# Patient Record
Sex: FEMALE | Race: WHITE | NOT HISPANIC OR LATINO | Employment: UNEMPLOYED | URBAN - METROPOLITAN AREA
[De-identification: names, ages, dates, MRNs, and addresses within clinical notes are randomized per-mention and may not be internally consistent; named-entity substitution may affect disease eponyms.]

---

## 2019-03-25 LAB
EXT 24 HR UR METANEPHRINE: ABNORMAL
EXT 24 HR UR NORMETANEPHRINE: ABNORMAL
EXT 24 HR UR NORMETANEPHRINE: ABNORMAL
EXT 25 HYDROXY VIT D2: ABNORMAL
EXT 25 HYDROXY VIT D3: ABNORMAL
EXT 5 HIAA 24 HR URINE: ABNORMAL
EXT 5 HIAA BLOOD: ABNORMAL
EXT ACTH: ABNORMAL
EXT AFP: ABNORMAL
EXT ALBUMIN: ABNORMAL
EXT ALKALINE PHOSPHATASE: ABNORMAL
EXT ALT: ABNORMAL
EXT AMYLASE: ABNORMAL
EXT ANTI ISLET CELL AB: ABNORMAL
EXT ANTI PARIETAL CELL AB: ABNORMAL
EXT ANTI THYROID AB: ABNORMAL
EXT AST: ABNORMAL
EXT BILIRUBIN DIRECT: ABNORMAL MG/DL
EXT BILIRUBIN TOTAL: ABNORMAL
EXT BK VIRUS DNA QN PCR: ABNORMAL
EXT BUN: ABNORMAL
EXT C PEPTIDE: ABNORMAL
EXT CA 125: ABNORMAL
EXT CA 19-9: ABNORMAL
EXT CA 27-29: ABNORMAL
EXT CALCITONIN: ABNORMAL
EXT CALCIUM: ABNORMAL
EXT CEA: ABNORMAL
EXT CHLORIDE: ABNORMAL
EXT CHOLESTEROL: ABNORMAL
EXT CHROMOGRANIN A: 2596 (ref 0–95)
EXT CO2: ABNORMAL
EXT CREATININE UA: ABNORMAL
EXT CREATININE: ABNORMAL MG/DL
EXT CYCLOSPORONE LEVEL: ABNORMAL
EXT DOPAMINE: ABNORMAL
EXT EBV DNA BY PCR: ABNORMAL
EXT EPINEPHRINE: ABNORMAL
EXT FOLATE: ABNORMAL
EXT FREE T3: ABNORMAL
EXT FREE T4: ABNORMAL
EXT FSH: ABNORMAL
EXT GASTRIN RELEASING PEPTIDE: ABNORMAL
EXT GASTRIN RELEASING PEPTIDE: ABNORMAL
EXT GASTRIN: ABNORMAL
EXT GGT: ABNORMAL
EXT GHRELIN: ABNORMAL
EXT GLUCAGON: ABNORMAL
EXT GLUCOSE: ABNORMAL
EXT GROWTH HORMONE: ABNORMAL
EXT HCV RNA QUANT PCR: ABNORMAL
EXT HDL: ABNORMAL
EXT HEMATOCRIT: ABNORMAL
EXT HEMOGLOBIN A1C: ABNORMAL %
EXT HEMOGLOBIN: ABNORMAL
EXT HISTAMINE 24 HR URINE: ABNORMAL
EXT HISTAMINE: ABNORMAL
EXT IGF-1: ABNORMAL
EXT IMMUNKNOW (NON-STIMULATED): ABNORMAL
EXT IMMUNKNOW (STIMULATED): ABNORMAL
EXT INR: ABNORMAL
EXT INSULIN: ABNORMAL
EXT LANREOTIDE LEVEL: ABNORMAL
EXT LDH, TOTAL: ABNORMAL
EXT LDL CHOLESTEROL: ABNORMAL
EXT LIPASE: ABNORMAL
EXT MAGNESIUM: ABNORMAL
EXT METANEPHRINE FREE PLASMA: ABNORMAL
EXT MOTILIN: ABNORMAL
EXT NEUROKININ A CAMB: ABNORMAL
EXT NEUROKININ A ISI: ABNORMAL
EXT NEUROTENSIN: ABNORMAL
EXT NOREPINEPHRINE: ABNORMAL
EXT NORMETANEPHRINE: ABNORMAL
EXT NSE: ABNORMAL
EXT OCTREOTIDE LEVEL: ABNORMAL
EXT PANCREASTATIN CAMB: ABNORMAL
EXT PANCREASTATIN ISI: ABNORMAL
EXT PANCREATIC POLYPEPTIDE: ABNORMAL
EXT PHOSPHORUS: ABNORMAL
EXT PLATELETS: ABNORMAL
EXT POTASSIUM: ABNORMAL
EXT PROGRAF LEVEL: ABNORMAL
EXT PROLACTIN: ABNORMAL
EXT PROTEIN TOTAL: ABNORMAL
EXT PROTEIN UA: ABNORMAL
EXT PT: ABNORMAL
EXT PTH, INTACT: ABNORMAL
EXT PTT: ABNORMAL
EXT RAPAMUNE LEVEL: ABNORMAL
EXT SEROTONIN: ABNORMAL
EXT SODIUM: ABNORMAL MMOL/L
EXT SOMATOSTATIN: ABNORMAL
EXT SUBSTANCE P: ABNORMAL
EXT TRIGLYCERIDES: ABNORMAL
EXT TRYPTASE: ABNORMAL
EXT TSH: ABNORMAL
EXT URIC ACID: ABNORMAL
EXT URINE AMYLASE U/HR: ABNORMAL
EXT URINE AMYLASE U/L: ABNORMAL
EXT VASOACTIVE INTESTINAL POLYPEPTIDE: ABNORMAL
EXT VITAMIN B12: ABNORMAL
EXT VMA 24 HR URINE: ABNORMAL
EXT WBC: ABNORMAL
NEURON SPECIFIC ENOLASE: ABNORMAL

## 2019-04-23 LAB
EXT 24 HR UR METANEPHRINE: ABNORMAL
EXT 24 HR UR NORMETANEPHRINE: ABNORMAL
EXT 24 HR UR NORMETANEPHRINE: ABNORMAL
EXT 25 HYDROXY VIT D2: ABNORMAL
EXT 25 HYDROXY VIT D3: ABNORMAL
EXT 5 HIAA 24 HR URINE: ABNORMAL
EXT 5 HIAA BLOOD: ABNORMAL
EXT ACTH: ABNORMAL
EXT AFP: ABNORMAL
EXT ALBUMIN: ABNORMAL
EXT ALKALINE PHOSPHATASE: ABNORMAL
EXT ALT: ABNORMAL
EXT AMYLASE: ABNORMAL
EXT ANTI ISLET CELL AB: ABNORMAL
EXT ANTI PARIETAL CELL AB: ABNORMAL
EXT ANTI THYROID AB: ABNORMAL
EXT AST: ABNORMAL
EXT BILIRUBIN DIRECT: ABNORMAL MG/DL
EXT BILIRUBIN TOTAL: ABNORMAL
EXT BK VIRUS DNA QN PCR: ABNORMAL
EXT BUN: ABNORMAL
EXT C PEPTIDE: ABNORMAL
EXT CA 125: ABNORMAL
EXT CA 19-9: ABNORMAL
EXT CA 27-29: ABNORMAL
EXT CALCITONIN: ABNORMAL
EXT CALCIUM: ABNORMAL
EXT CEA: ABNORMAL
EXT CHLORIDE: ABNORMAL
EXT CHOLESTEROL: ABNORMAL
EXT CHROMOGRANIN A: 2335 (ref 0–95)
EXT CO2: ABNORMAL
EXT CREATININE UA: ABNORMAL
EXT CREATININE: ABNORMAL MG/DL
EXT CYCLOSPORONE LEVEL: ABNORMAL
EXT DOPAMINE: ABNORMAL
EXT EBV DNA BY PCR: ABNORMAL
EXT EPINEPHRINE: ABNORMAL
EXT FOLATE: ABNORMAL
EXT FREE T3: ABNORMAL
EXT FREE T4: ABNORMAL
EXT FSH: ABNORMAL
EXT GASTRIN RELEASING PEPTIDE: ABNORMAL
EXT GASTRIN RELEASING PEPTIDE: ABNORMAL
EXT GASTRIN: ABNORMAL
EXT GGT: ABNORMAL
EXT GHRELIN: ABNORMAL
EXT GLUCAGON: ABNORMAL
EXT GLUCOSE: ABNORMAL
EXT GROWTH HORMONE: ABNORMAL
EXT HCV RNA QUANT PCR: ABNORMAL
EXT HDL: ABNORMAL
EXT HEMATOCRIT: ABNORMAL
EXT HEMOGLOBIN A1C: ABNORMAL %
EXT HEMOGLOBIN: ABNORMAL
EXT HISTAMINE 24 HR URINE: ABNORMAL
EXT HISTAMINE: ABNORMAL
EXT IGF-1: ABNORMAL
EXT IMMUNKNOW (NON-STIMULATED): ABNORMAL
EXT IMMUNKNOW (STIMULATED): ABNORMAL
EXT INR: ABNORMAL
EXT INSULIN: ABNORMAL
EXT LANREOTIDE LEVEL: ABNORMAL
EXT LDH, TOTAL: ABNORMAL
EXT LDL CHOLESTEROL: ABNORMAL
EXT LIPASE: ABNORMAL
EXT MAGNESIUM: ABNORMAL
EXT METANEPHRINE FREE PLASMA: ABNORMAL
EXT MOTILIN: ABNORMAL
EXT NEUROKININ A CAMB: ABNORMAL
EXT NEUROKININ A ISI: ABNORMAL
EXT NEUROTENSIN: ABNORMAL
EXT NOREPINEPHRINE: ABNORMAL
EXT NORMETANEPHRINE: ABNORMAL
EXT NSE: ABNORMAL
EXT OCTREOTIDE LEVEL: ABNORMAL
EXT PANCREASTATIN CAMB: ABNORMAL
EXT PANCREASTATIN ISI: ABNORMAL
EXT PANCREATIC POLYPEPTIDE: ABNORMAL
EXT PHOSPHORUS: ABNORMAL
EXT PLATELETS: ABNORMAL
EXT POTASSIUM: ABNORMAL
EXT PROGRAF LEVEL: ABNORMAL
EXT PROLACTIN: ABNORMAL
EXT PROTEIN TOTAL: ABNORMAL
EXT PROTEIN UA: ABNORMAL
EXT PT: ABNORMAL
EXT PTH, INTACT: ABNORMAL
EXT PTT: ABNORMAL
EXT RAPAMUNE LEVEL: ABNORMAL
EXT SEROTONIN: ABNORMAL
EXT SODIUM: ABNORMAL MMOL/L
EXT SOMATOSTATIN: ABNORMAL
EXT SUBSTANCE P: ABNORMAL
EXT TRIGLYCERIDES: ABNORMAL
EXT TRYPTASE: ABNORMAL
EXT TSH: ABNORMAL
EXT URIC ACID: ABNORMAL
EXT URINE AMYLASE U/HR: ABNORMAL
EXT URINE AMYLASE U/L: ABNORMAL
EXT VASOACTIVE INTESTINAL POLYPEPTIDE: ABNORMAL
EXT VITAMIN B12: ABNORMAL
EXT VMA 24 HR URINE: ABNORMAL
EXT WBC: ABNORMAL
NEURON SPECIFIC ENOLASE: ABNORMAL

## 2019-05-16 LAB
EXT 24 HR UR METANEPHRINE: ABNORMAL
EXT 24 HR UR NORMETANEPHRINE: ABNORMAL
EXT 24 HR UR NORMETANEPHRINE: ABNORMAL
EXT 25 HYDROXY VIT D2: ABNORMAL
EXT 25 HYDROXY VIT D3: ABNORMAL
EXT 5 HIAA 24 HR URINE: ABNORMAL
EXT 5 HIAA BLOOD: ABNORMAL
EXT ACTH: ABNORMAL
EXT AFP: ABNORMAL
EXT ALBUMIN: 4.1 G/DL (ref 3.5–5)
EXT ALKALINE PHOSPHATASE: 75 U/L (ref 38–126)
EXT ALT: 14 U/L (ref 11–66)
EXT AMYLASE: ABNORMAL
EXT ANTI ISLET CELL AB: ABNORMAL
EXT ANTI PARIETAL CELL AB: ABNORMAL
EXT ANTI THYROID AB: ABNORMAL
EXT AST: 16 U/L (ref 15–46)
EXT BILIRUBIN DIRECT: ABNORMAL MG/DL
EXT BILIRUBIN TOTAL: 1.3 MG/DL (ref 0.2–1.3)
EXT BK VIRUS DNA QN PCR: ABNORMAL
EXT BUN: 10 MG/DL (ref 9–21)
EXT C PEPTIDE: ABNORMAL
EXT CA 125: ABNORMAL
EXT CA 19-9: ABNORMAL
EXT CA 27-29: ABNORMAL
EXT CALCITONIN: ABNORMAL
EXT CALCIUM: 9.4 MG/DL (ref 8.4–10.6)
EXT CEA: ABNORMAL
EXT CHLORIDE: 107 MMOL/L (ref 98–107)
EXT CHOLESTEROL: ABNORMAL
EXT CHROMOGRANIN A: 2998 NG/ML (ref 0–95)
EXT CO2: 22 MMOL/L (ref 22–30)
EXT CREATININE UA: ABNORMAL
EXT CREATININE: 0.82 MG/DL (ref 0.57–1.11)
EXT CYCLOSPORONE LEVEL: ABNORMAL
EXT DOPAMINE: ABNORMAL
EXT EBV DNA BY PCR: ABNORMAL
EXT EPINEPHRINE: ABNORMAL
EXT FOLATE: ABNORMAL
EXT FREE T3: ABNORMAL
EXT FREE T4: ABNORMAL
EXT FSH: ABNORMAL
EXT GASTRIN RELEASING PEPTIDE: ABNORMAL
EXT GASTRIN RELEASING PEPTIDE: ABNORMAL
EXT GASTRIN: ABNORMAL
EXT GGT: ABNORMAL
EXT GHRELIN: ABNORMAL
EXT GLUCAGON: ABNORMAL
EXT GLUCOSE: 144 MG/DL (ref 70–110)
EXT GROWTH HORMONE: ABNORMAL
EXT HCV RNA QUANT PCR: ABNORMAL
EXT HDL: ABNORMAL
EXT HEMATOCRIT: 37.7 % (ref 37.7–47.9)
EXT HEMOGLOBIN A1C: ABNORMAL %
EXT HEMOGLOBIN: 12.8 G/DL (ref 12.2–15.2)
EXT HISTAMINE 24 HR URINE: ABNORMAL
EXT HISTAMINE: ABNORMAL
EXT IGF-1: ABNORMAL
EXT IMMUNKNOW (NON-STIMULATED): ABNORMAL
EXT IMMUNKNOW (STIMULATED): ABNORMAL
EXT INR: ABNORMAL
EXT INSULIN: ABNORMAL
EXT LANREOTIDE LEVEL: ABNORMAL
EXT LDH, TOTAL: ABNORMAL
EXT LDL CHOLESTEROL: ABNORMAL
EXT LIPASE: ABNORMAL
EXT MAGNESIUM: ABNORMAL
EXT METANEPHRINE FREE PLASMA: ABNORMAL
EXT MOTILIN: ABNORMAL
EXT NEUROKININ A CAMB: ABNORMAL
EXT NEUROKININ A ISI: ABNORMAL
EXT NEUROTENSIN: ABNORMAL
EXT NOREPINEPHRINE: ABNORMAL
EXT NORMETANEPHRINE: ABNORMAL
EXT NSE: ABNORMAL
EXT OCTREOTIDE LEVEL: ABNORMAL
EXT PANCREASTATIN CAMB: ABNORMAL
EXT PANCREASTATIN ISI: ABNORMAL
EXT PANCREATIC POLYPEPTIDE: ABNORMAL
EXT PHOSPHORUS: ABNORMAL
EXT PLATELETS: 209 10X3/UL (ref 142–424)
EXT POTASSIUM: 3.7 MMOL/L (ref 3.6–5)
EXT PROGRAF LEVEL: ABNORMAL
EXT PROLACTIN: ABNORMAL
EXT PROTEIN TOTAL: 6.7 G/DL (ref 6.3–8.2)
EXT PROTEIN UA: ABNORMAL
EXT PT: ABNORMAL
EXT PTH, INTACT: ABNORMAL
EXT PTT: ABNORMAL
EXT RAPAMUNE LEVEL: ABNORMAL
EXT SEROTONIN: ABNORMAL
EXT SODIUM: 140 MMOL/L (ref 136–146)
EXT SOMATOSTATIN: ABNORMAL
EXT SUBSTANCE P: ABNORMAL
EXT TRIGLYCERIDES: ABNORMAL
EXT TRYPTASE: ABNORMAL
EXT TSH: ABNORMAL
EXT URIC ACID: ABNORMAL
EXT URINE AMYLASE U/HR: ABNORMAL
EXT URINE AMYLASE U/L: ABNORMAL
EXT VASOACTIVE INTESTINAL POLYPEPTIDE: ABNORMAL
EXT VITAMIN B12: ABNORMAL
EXT VMA 24 HR URINE: ABNORMAL
EXT WBC: 5.7 10X3/UL (ref 4.6–10.9)
NEURON SPECIFIC ENOLASE: ABNORMAL

## 2019-05-28 NOTE — PROGRESS NOTES
"Subjective:      Patient ID: Christina Muro is a 59 y.o. female.    Chief Complaint:  Diabetes (New patient )    History of Present Illness  Christina Muro is here for evaluation and management of carcinoid tumor.  This is their first visit with me.      Unexplained diarrhea: chronic.  Over the last 3 months this has worsened.  Immediately after she eats she has to has diarrhea.    Unexplained flushing: has been over 3.5 years since she's had "carcinoid flushing" that would last 15-20 minutes.     ONCOLOGIC HISTORY:  1. Patient was first diagnosed with well-differentiated cecal carcinoid in 1/2011 when she underwent ileocecal segmental resection (unknown T and N stage). She was found to have metastatic disease in her liver in 12/2001 and underwent partal hepatectomy with complete removal of her metastatic disease.  2. She remained disease free when she recurred in the liver in 12/2007. She then again underwent partial hepatectomy again in 1/16/2008 with resection of all evident disease. Pathology from this specimen reconfirmed the presence of her original well differentiated NET.  3. She remained disease free until a liver MRI in 1/2009 revealed concern for bi-lobar hepatic recurrence. She underwent an octreoscan shortly thereafter, established with  ([facility]) and started octreotide LAR monthly.  4. Patient remained on octreotide LAR until 3/2016 when in the setting of progression she was switched to lanreotide 120 mg monthly. She remains on this dose however was have noted to have progression in her liver on CT 5/10/2018 and abdominal MRI 5/24/2018. The most recent scan revealed at least 20 lesions per the outside read.   5. Her PS is 0/1. She has been having 4-8 episodes of fatty foul smelling stool with bowel urgency.    History of Present Illness:  is a 58 year old woman with metastatic well differentiated cecal carcinoid who presents for a repeat evaluation in the setting of " recent progression (radiographically and clinically) on her lanreotide. She denies SOB, LE edema, nausea, vomiting, abdominal pain, light-headedness or weight loss. She has been feeling quite well with the exception of her bowel habits (noted above).   Assessment and Plan:   #Metastatic Well Differentiated NET: patient appears to have progressed radiographically on lanreotide with worsening disease burden in her liver. She has been experiencing worsening diarrhea over the last several months which likely represents clinical progression. We had an extensive discussion with the patient about the trajectory of her disease, treatment options moving forward and the logistics of PRRT. We will obtain a gallium dotatate PET to fully assess the burden of her disease and put her on our scheduling list for PRRT. We have also ordered a baseline echocardiogram to assess her valvular function given her long standing carcinoid and plasma 5HIAA and chromogranin A to assess the functional status and disease burden, respectively. Patient can continue on lanreotide monthly while she awaits PRRT. Once we have a date we will typically stagger lanreotide such that patient is off the agent for 4 weeks prior to administration (she would receive SSA the same day after PRRT infusion is complete). RTC to be determined based on PRRT availability. Per records they may start:  LUTATHERA® (lutetium Brii 177 dotatate) is a prescription medicine used to treat adults with a type of cancer known as gastroenteropancreatic neuroendocrine tumors (GEP-NETs) that are positive for the hormone receptor somatostatin, including GEP-NETs in the foregut, midgut, and hindgut.    Per the patient, she has been on Lanreotide injection every 4 weeks.  This was initiated by Dr. Kehinde Rhodes on 3/9/09.  Was self administering multi daily injections to herself for several years then switched over to every 4 week injections.  Currently managed by Dr. Guerrier in [place],  [place] who is a carcinoid specialist.     Review of Systems   Constitutional: Negative for fatigue and unexpected weight change.   Eyes: Negative for visual disturbance.   Respiratory: Negative for cough and shortness of breath.    Cardiovascular: Negative for chest pain.   Gastrointestinal: Positive for diarrhea. Negative for abdominal pain.   Endocrine: Negative for cold intolerance, heat intolerance, polydipsia, polyphagia and polyuria.   Musculoskeletal: Negative for arthralgias.   Skin: Negative for wound.   Neurological: Negative for headaches.   Hematological: Does not bruise/bleed easily.   Psychiatric/Behavioral: Negative for sleep disturbance.     Objective:   Physical Exam   Constitutional: She appears well-developed.   HENT:   Right Ear: External ear normal.   Left Ear: External ear normal.   Nose: Nose normal.   Hearing Normal     Neck: No tracheal deviation present. No thyromegaly present.   Cardiovascular: Normal rate.   No murmur heard.  No edema present   Pulmonary/Chest: Effort normal and breath sounds normal.   Abdominal: Soft. She exhibits no mass. No hernia.   Neurological: She is alert. No cranial nerve deficit or sensory deficit.   Skin: No rash noted.   No nodules.   Psychiatric: She has a normal mood and affect. Judgment normal.   Vitals reviewed.    Body mass index is 22.76 kg/m².    Lab Review:   Chromogranin A 1,668 (H)    Pet Tumor Ga68 Dotatate: 7/12/2018  BRAIN:   Normal high uptake of Gallium-68-Dotatate is demonstrated in the   pituitary gland. No abnormal areas of increased uptake are   demonstrated in the brain.     HEAD AND NECK:  Normal uptake of Gallium-68-Dotatate is seen in the thyroid gland. No   abnormal areas of increased uptake are demonstrated in the head and   neck.     CHEST:  The thoracic inlet is normal. The lungs are clear. The heart is   mildly enlarged. There is no pericardial effusion.    ABDOMEN/PELVIS:  Multiple regions of increased uptake are demonstrated in the  liver,   the  largest of which corresponds to a 3.9 x 3.6 cm hypodensity   anterolaterally, increased in size from prior abdominal MRI from   5/24/2018 when it measured 3.6 x 3.3 cm. Additional notable areas of   increased uptake are demonstrated in the miky hepatis corresponding   to a 1 cm lymph node, a right retroperitoneal lesion, and three sub   cm aortocaval nodes at the level of the renal vessels,  L2-L3 disc   space, and the superior aspect of L4.      Postoperative findings of prior ileocecectomy are demonstrated with   intact ileocolic anastomosis in the right lower quadrant. There is   soft tissue stranding and nodularity in the pelvic mesentery   moderately increased in extent, measuring approximately 5 x 2 cm) as   compared to 6/14/2017 and associated with intense uptake of   Gallium-68-Dotatate.     Physiologic Gallium-68-Dotatate uptake is demonstrated in the spleen,   adrenal glands, pancreas, kidneys, collecting systems, and bladder.   Scattered areas of mild-to-moderate uptake of radiotracer are  No areas of abnormal Dotatate uptake in the axial or visualized   appendicular skeleton is appreciated other than mild uptake   associated with prominent anterior osteophytes in the lower cervical   spine.   IMPRESSION:  1.  Multiple large hepatic lesions showing high Gallium-68-Dotatate   compatible with well-differentiated neuroendocrine metastases, more   extensive than suggested by prior outside MR of the abdomen from   5/24/2018.  2.  Additional areas of high Gallium-68-Dotatate are seen at the   miky hepatis, right aspect of the retroperitoneum, and high and low   aortocaval nodes, compatible with tumor.    3.  High Gallium-68-Dotatate uptake in the pelvic mesentery to the   site of ileocolic anastomosis is highly suggestive of local tumor   recurrence as well.     Pathology report 2/2009  DIAGNOSIS:    1) CECAL MASS, BIOPSY ([id number], A1, 2 SLIDES, 01/09/01):  WELL-DIFFERENTIATED  NEUROENDOCRINE CARCINOMA IN THE BACKGROUND OF ULCERATED  COLONIC MUCOSA.    2) RIGHT COLON AND PORTION OF ILEUM AND GALLBLADDER, RESECTION  ([id number], A1-14, B, 01/12/01): WELL-DIFFERENTIATED NEUROENDOCRINE  CARCINOMA WITH NECROSIS, 3.0 CM IN GREATEST EXTENT(PER REPORT), WITH  EXTENSION THROUGH MUSCULARIS PROPRIA INTO PERICOLONIC ADIPOSE TISSUE;  LYMPHOVASCULAR INVASION PRESENT; 3 OF 13 PERICOLONIC LYMPH NODES INVOLVED  BY METASTATIC NEUROENDOCRINE CARCINOMA; APPENDIX WITH NO SIGNIFICANT  HISTOPATHOLOGIC CHANGE; CHRONIC CHOLECYSTITIS. (SEE COMMENT)    COMMENT: This well-differentiated neuroendocrine carcinoma has a moderate  amount of atypia with scattered rare mitoses. However, this lesion most  likely will behave in an aggressive manner based its large size, abundant  lamina propria invasion, tumoral necrosis and lymphovascular invasion.    3) LIVER, SEGMENT 2, WEDGE RESECTION ([id number], A1-A2, 3 SLIDES, 1/18/08):  METASTATIC WELL DIFFERENTIATED NEUROENDOCRINE CARCINOMA, MARGINS FREE OF  TUMOR; CENTRILOBULAR MICROSTEATOSIS AND MILD PERIPORTAL FIBROSIS (SEE  COMMENT).    COMMENT: Outside Telly trichrome stain is reviewed and supports the above  diagnosis.    4) LIVER, RIGHT LOBE, SEGMENT 8, WEDGE RESECTION ([id number], A1-B10, 17  SLIDES, 1/18/08): METASTATIC WELL DIFFERENTIATED NEUROENDOCRINE CARCINOMA,  FOCALLY EXTENDING TO CAUTERIZED EDGE, MARGINS FREE OF TUMOR (PER REPORT);  LYMPHOVASCULAR INVASION PRESENT.    5) LIVER, SEGMENT 8 SECOND PORTION, WEDGE RESECTION ([id number], C1-C2, 2  SLIDES, 1/18/08): METASTATIC WELL DIFFERENTIATED NEUROENDOCRINE CARCINOMA,  MARGINS FREE OF TUMOR.    6) LIVER, SEGMENT 8 THIRD PORTION, WEDGE RESECTION ([id number], D1-D2, 2  SLIDES, 1/18/08): METASTATIC WELL DIFFERENTIATED NEUROENDOCRINE CARCINOMA,  MARGINS FREE OF TUMOR.    7) LIVER, SEGMENT 2, WEDGE RESECTION (S08-2090, E1-E2, 2 SLIDES, 1/18/08):  METASTATIC WELL DIFFERENTIATED NEUROENDOCRINE CARCINOMA, MARGINS FREE  OF  TUMOR.  No results found for: HGBA1C  No results found for: CHOL, HDL, LDLCALC, TRIG, CHOLHDL  No results found for: NA, K, CL, CO2, GLU, BUN, CREATININE, CALCIUM, PROT, ALBUMIN, BILITOT, ALKPHOS, AST, ALT, ANIONGAP, ESTGFRAFRICA, EGFRNONAA, TSH    Assessment and Plan     1. Neuroendocrine tumor  Ambulatory Referral to Neuroendocrine Tumor Program     Neuroendocrine tumor  -- Due to patient wanting to see a neuroendocrine specialist, referral placed to see Dr. Gant  -- Patient is already following with Dr. Guerrier, a carcinoid specialist in [place] and being treated with Lanreotide injections every 4 weeks  -- With recent decline related to symptoms, patient would like a second opinion on Dr. Guerrier recommendations of trying PRRT and/or radioactive iodine infusions  -- Recommended that the patient obtain a BMD through her PCP or Dr. Guerrier when she returns home  -- Patient prefers to wait for blood work with neuroendocrine specialists, but we do recommend checking CMP, Vit D and HgA1c    Case discussed with Kunal  Recommendations were discussed with the patient in detail  The patient verbalized understanding and agrees with the plan outlined as above.

## 2019-06-04 ENCOUNTER — OFFICE VISIT (OUTPATIENT)
Dept: ENDOCRINOLOGY | Facility: CLINIC | Age: 59
End: 2019-06-04
Payer: COMMERCIAL

## 2019-06-04 VITALS
DIASTOLIC BLOOD PRESSURE: 60 MMHG | WEIGHT: 136.81 LBS | SYSTOLIC BLOOD PRESSURE: 90 MMHG | HEIGHT: 65 IN | BODY MASS INDEX: 22.8 KG/M2 | HEART RATE: 69 BPM

## 2019-06-04 DIAGNOSIS — D3A.8 NEUROENDOCRINE TUMOR: Primary | ICD-10-CM

## 2019-06-04 PROCEDURE — 99999 PR PBB SHADOW E&M-NEW PATIENT-LVL III: ICD-10-PCS | Mod: PBBFAC,,, | Performed by: NURSE PRACTITIONER

## 2019-06-04 PROCEDURE — 3008F BODY MASS INDEX DOCD: CPT | Mod: CPTII,S$GLB,, | Performed by: NURSE PRACTITIONER

## 2019-06-04 PROCEDURE — 99203 PR OFFICE/OUTPT VISIT, NEW, LEVL III, 30-44 MIN: ICD-10-PCS | Mod: S$GLB,,, | Performed by: NURSE PRACTITIONER

## 2019-06-04 PROCEDURE — 3008F PR BODY MASS INDEX (BMI) DOCUMENTED: ICD-10-PCS | Mod: CPTII,S$GLB,, | Performed by: NURSE PRACTITIONER

## 2019-06-04 PROCEDURE — 99203 OFFICE O/P NEW LOW 30 MIN: CPT | Mod: S$GLB,,, | Performed by: NURSE PRACTITIONER

## 2019-06-04 PROCEDURE — 99999 PR PBB SHADOW E&M-NEW PATIENT-LVL III: CPT | Mod: PBBFAC,,, | Performed by: NURSE PRACTITIONER

## 2019-06-04 RX ORDER — ESCITALOPRAM OXALATE 10 MG/1
TABLET ORAL
COMMUNITY

## 2019-06-04 RX ORDER — ACETAMINOPHEN AND PHENYLEPHRINE HCL 325; 5 MG/1; MG/1
7 TABLET ORAL
COMMUNITY

## 2019-06-04 NOTE — ASSESSMENT & PLAN NOTE
-- Due to patient wanting to see a neuroendocrine specialist, referral placed to see Dr. Gant  -- Patient is already following with Dr. Guerrier, a carcinoid specialist in [place] and being treated with Lanreotide injections every 4 weeks  -- With recent decline related to symptoms, patient would like a second opinion on Dr. Guerrier recommendations of trying PRRT and/or radioactive iodine infusions  -- Recommended that the patient obtain a BMD through her PCP or Dr. Guerrier when she returns home  -- Patient prefers to wait for blood work with neuroendocrine specialists, but we do recommend checking CMP, Vit D and HgA1c

## 2019-06-06 ENCOUNTER — TELEPHONE (OUTPATIENT)
Dept: NEUROLOGY | Facility: HOSPITAL | Age: 59
End: 2019-06-06

## 2019-06-06 ENCOUNTER — TELEPHONE (OUTPATIENT)
Dept: ENDOCRINOLOGY | Facility: CLINIC | Age: 59
End: 2019-06-06

## 2019-06-06 DIAGNOSIS — C7A.021 MALIGNANT CARCINOID TUMOR OF THE CECUM: Primary | ICD-10-CM

## 2019-06-06 NOTE — TELEPHONE ENCOUNTER
"Spoke to patient, discussed her history.  She was diagnosed with NET of cecum in 2001, then mets to liver same year.  She then recurred with hepatectomy years later.  She was seen at our clinic in 2009 or 2010 by Dr. Kehinde Rhodes, will have her chart pulled if it is still there.  She is currently on Lanreotide only. She is currently seeing DR. Devante Guerrier with [facility] and was referred back to our clinic.  She states that she was "scanned" recently but doesn't remember what scan. She thinks she had a Ga68 scan because she had to go somewhere different for that scan. I asked her to find the scans and have them sent to our institution.  I will request records from [facility] since I have an AMDELINE.  Address given to patient to mail scan.  I will contact patient when information received and let her know if any additional testing is needed prior to making an appt.     "

## 2019-06-06 NOTE — TELEPHONE ENCOUNTER
----- Message from Kristin Ashby sent at 6/6/2019  9:41 AM CDT -----  Contact: Yara / [facility] [phone]   Yara is requesting office note from visit from 6/4.     [phone] (Fax)

## 2019-06-10 ENCOUNTER — TELEPHONE (OUTPATIENT)
Dept: NEUROLOGY | Facility: HOSPITAL | Age: 59
End: 2019-06-10

## 2019-06-10 NOTE — TELEPHONE ENCOUNTER
----- Message from Carlton Philip sent at 6/10/2019 10:28 AM CDT -----  Contact: Dr. Guerrier ([facility])   Calling to verify exactly which office notes (labs and scans) that are needing to be faxed over.        Contact:: [phone]

## 2019-06-13 ENCOUNTER — TELEPHONE (OUTPATIENT)
Dept: NEUROLOGY | Facility: HOSPITAL | Age: 59
End: 2019-06-13

## 2019-06-13 LAB
EXT 24 HR UR METANEPHRINE: ABNORMAL
EXT 24 HR UR NORMETANEPHRINE: ABNORMAL
EXT 24 HR UR NORMETANEPHRINE: ABNORMAL
EXT 25 HYDROXY VIT D2: ABNORMAL
EXT 25 HYDROXY VIT D3: ABNORMAL
EXT 5 HIAA 24 HR URINE: ABNORMAL
EXT 5 HIAA BLOOD: ABNORMAL
EXT ACTH: ABNORMAL
EXT AFP: ABNORMAL
EXT ALBUMIN: 4.1 (ref 3.5–5)
EXT ALKALINE PHOSPHATASE: 85 (ref 38–126)
EXT ALT: 17 (ref 11–86)
EXT AMYLASE: ABNORMAL
EXT ANTI ISLET CELL AB: ABNORMAL
EXT ANTI PARIETAL CELL AB: ABNORMAL
EXT ANTI THYROID AB: ABNORMAL
EXT AST: 16 (ref 15–46)
EXT BILIRUBIN DIRECT: ABNORMAL MG/DL
EXT BILIRUBIN TOTAL: 1.2 (ref 0.2–1.3)
EXT BK VIRUS DNA QN PCR: ABNORMAL
EXT BUN: 9 (ref 9–21)
EXT C PEPTIDE: ABNORMAL
EXT CA 125: ABNORMAL
EXT CA 19-9: ABNORMAL
EXT CA 27-29: ABNORMAL
EXT CALCITONIN: ABNORMAL
EXT CALCIUM: 9.6 (ref 8.4–10.6)
EXT CEA: ABNORMAL
EXT CHLORIDE: 107 (ref 98–107)
EXT CHOLESTEROL: ABNORMAL
EXT CHROMOGRANIN A: 3055 (ref 0–95)
EXT CO2: 28 (ref 22–30)
EXT CREATININE UA: ABNORMAL
EXT CREATININE: 0.77 MG/DL (ref 0.57–1.11)
EXT CYCLOSPORONE LEVEL: ABNORMAL
EXT DOPAMINE: ABNORMAL
EXT EBV DNA BY PCR: ABNORMAL
EXT EPINEPHRINE: ABNORMAL
EXT FOLATE: ABNORMAL
EXT FREE T3: ABNORMAL
EXT FREE T4: ABNORMAL
EXT FSH: ABNORMAL
EXT GASTRIN RELEASING PEPTIDE: ABNORMAL
EXT GASTRIN RELEASING PEPTIDE: ABNORMAL
EXT GASTRIN: ABNORMAL
EXT GGT: ABNORMAL
EXT GHRELIN: ABNORMAL
EXT GLUCAGON: ABNORMAL
EXT GLUCOSE: 93 (ref 70–110)
EXT GROWTH HORMONE: ABNORMAL
EXT HCV RNA QUANT PCR: ABNORMAL
EXT HDL: ABNORMAL
EXT HEMATOCRIT: 38.1 (ref 37.7–47.9)
EXT HEMOGLOBIN A1C: 5.4 % (ref 0–5.7)
EXT HEMOGLOBIN: 12.7 (ref 12.2–15.2)
EXT HISTAMINE 24 HR URINE: ABNORMAL
EXT HISTAMINE: ABNORMAL
EXT IGF-1: ABNORMAL
EXT IMMUNKNOW (NON-STIMULATED): ABNORMAL
EXT IMMUNKNOW (STIMULATED): ABNORMAL
EXT INR: ABNORMAL
EXT INSULIN: ABNORMAL
EXT LANREOTIDE LEVEL: ABNORMAL
EXT LDH, TOTAL: ABNORMAL
EXT LDL CHOLESTEROL: ABNORMAL
EXT LIPASE: ABNORMAL
EXT MAGNESIUM: ABNORMAL
EXT METANEPHRINE FREE PLASMA: ABNORMAL
EXT MOTILIN: ABNORMAL
EXT NEUROKININ A CAMB: ABNORMAL
EXT NEUROKININ A ISI: ABNORMAL
EXT NEUROTENSIN: ABNORMAL
EXT NOREPINEPHRINE: ABNORMAL
EXT NORMETANEPHRINE: ABNORMAL
EXT NSE: ABNORMAL
EXT OCTREOTIDE LEVEL: ABNORMAL
EXT PANCREASTATIN CAMB: ABNORMAL
EXT PANCREASTATIN ISI: ABNORMAL
EXT PANCREATIC POLYPEPTIDE: ABNORMAL
EXT PHOSPHORUS: ABNORMAL
EXT PLATELETS: 210 (ref 142–424)
EXT POTASSIUM: 4.5 (ref 3.6–5)
EXT PROGRAF LEVEL: ABNORMAL
EXT PROLACTIN: ABNORMAL
EXT PROTEIN TOTAL: 6.7 (ref 6.3–8.2)
EXT PROTEIN UA: ABNORMAL
EXT PT: ABNORMAL
EXT PTH, INTACT: ABNORMAL
EXT PTT: ABNORMAL
EXT RAPAMUNE LEVEL: ABNORMAL
EXT SEROTONIN: ABNORMAL
EXT SODIUM: 141 MMOL/L (ref 136–146)
EXT SOMATOSTATIN: ABNORMAL
EXT SUBSTANCE P: ABNORMAL
EXT TRIGLYCERIDES: ABNORMAL
EXT TRYPTASE: ABNORMAL
EXT TSH: ABNORMAL
EXT URIC ACID: ABNORMAL
EXT URINE AMYLASE U/HR: ABNORMAL
EXT URINE AMYLASE U/L: ABNORMAL
EXT VASOACTIVE INTESTINAL POLYPEPTIDE: ABNORMAL
EXT VITAMIN B12: ABNORMAL
EXT VMA 24 HR URINE: ABNORMAL
EXT WBC: 5.1 (ref 4.6–10.9)
NEURON SPECIFIC ENOLASE: ABNORMAL

## 2019-06-13 NOTE — TELEPHONE ENCOUNTER
LVM with office asking if patient had a Ga68 PET/CT recently.  Will contact patient to locate scans as all other records have been received.

## 2019-06-17 ENCOUNTER — TELEPHONE (OUTPATIENT)
Dept: NEUROLOGY | Facility: HOSPITAL | Age: 59
End: 2019-06-17

## 2019-06-17 NOTE — TELEPHONE ENCOUNTER
Spoke to patient, she will find report of Ga68, have it faxed, then I will request the CD from the local agency for our records. I have schedule her initial appt, will ask for reservation at the [facility].

## 2019-06-17 NOTE — TELEPHONE ENCOUNTER
----- Message from Miri Ellison RN sent at 6/6/2019  4:02 PM CDT -----  Check on records and scans

## 2019-06-18 NOTE — TELEPHONE ENCOUNTER
----- Message from Miri Ellison RN sent at 6/17/2019  5:46 PM CDT -----  Regarding: Rosey Mares  She would like a reservation July 22nd to July 23rd. She is a new patient and has the paperwork.

## 2019-07-02 ENCOUNTER — TELEPHONE (OUTPATIENT)
Dept: NEUROLOGY | Facility: HOSPITAL | Age: 59
End: 2019-07-02

## 2019-07-02 NOTE — TELEPHONE ENCOUNTER
----- Message from Avis Deleon sent at 7/2/2019  8:30 AM CDT -----  Contact: Self/ [phone]  Patient would like to add a day of July 21, 2019  to her [place] Reservation Request.  Please call.

## 2019-07-15 ENCOUNTER — TELEPHONE (OUTPATIENT)
Dept: NEUROLOGY | Facility: HOSPITAL | Age: 59
End: 2019-07-15

## 2019-07-15 NOTE — TELEPHONE ENCOUNTER
Forwarded message to JENNY Valverde.      HISTORY       Past Surgical History:   Procedure Laterality Date    CATARACT REMOVAL WITH IMPLANT Left 5/14/2014    CHOLECYSTECTOMY      COLON SURGERY  1991    COLONOSCOPY  07/02/2015    diverticulosis    CORONARY ANGIOPLASTY      CYSTOSCOPY Right 03/23/2017    Cysto, retro, ureteroscopy. Stone manipulation with out extraction right stent placement     GALLBLADDER SURGERY      HEMORRHOID SURGERY      HYSTERECTOMY, TOTAL ABDOMINAL  1980    OTHER SURGICAL HISTORY  05/28/2014    phacoemulsification of cataract with intraocular lens implant right eye    VARICOSE VEIN SURGERY           CURRENT MEDICATIONS       Previous Medications    AMLODIPINE (NORVASC) 2.5 MG TABLET    Take 1 tablet by mouth daily    ASPIRIN EC 81 MG EC TABLET    Take 1 tablet by mouth daily With food    ATORVASTATIN (LIPITOR) 20 MG TABLET    Take 1 tablet by mouth nightly    CALCIUM CARBONATE 600 MG TABS TABLET    Take 1 tablet by mouth daily    CYCLOBENZAPRINE (FLEXERIL) 10 MG TABLET    Take 10 mg by mouth nightly    DICLOFENAC SODIUM (VOLTAREN) 1 % GEL    Apply 2 g topically 4 times daily    FERROUS SULFATE 325 (65 FE) MG TABLET    Take 325 mg by mouth daily (with breakfast)    FISH OIL-OMEGA-3 FATTY ACIDS 1000 MG CAPSULE    Take 1 g by mouth daily     HYDROCODONE-ACETAMINOPHEN (NORCO) 5-325 MG PER TABLET    Take 1 tablet by mouth every 6 hours as needed for Pain. Sara January LANSOPRAZOLE (PREVACID) 30 MG DELAYED RELEASE CAPSULE    TAKE 1 CAPSULE DAILY.     MELATONIN 10 MG TABS    Take 10 mg by mouth nightly as needed    MELOXICAM (MOBIC) 15 MG TABLET    Take 15 mg by mouth daily    METHYLCELLULOSE (CITRUCEL) 500 MG TABS    Take 1 tablet by mouth daily    METOPROLOL SUCCINATE (TOPROL XL) 50 MG EXTENDED RELEASE TABLET    Take 1 tablet by mouth daily    MIRABEGRON ER (MYRBETRIQ) 25 MG TB24    TAKE 1 TABLET DAILY    MULTIPLE VITAMINS-MINERALS (THERAPEUTIC MULTIVITAMIN-MINERALS) TABLET    Take 1 tablet by mouth daily    NITROGLYCERIN (NITROSTAT) 0.4 MG SL TABLET    Place 1 tablet under the tongue every 5 minutes as needed for Chest pain    OLANZAPINE-FLUOXETINE (SYMBYAX) 6-25 MG CAPS CAPSULE    TAKE 1 CAPSULE EVERY       EVENING    POLYETHYLENE GLYCOL (GLYCOLAX) POWDER    POUR 17GM OF POWDER INTO   MEASURING CAP STIR INTO 8OZOF WATER AND DRINK DAILY       ALLERGIES     Sulfamethoxazole; Trimethoprim; Wellbutrin [bupropion hcl]; Advil [ibuprofen micronized]; Bactrim; Erythromycin; Flexeril [cyclobenzaprine hcl]; and Guaifenesin er    FAMILY HISTORY       Family History   Problem Relation Age of Onset    Cancer Mother     Stroke Father           SOCIAL HISTORY       Social History     Socioeconomic History    Marital status:       Spouse name: None    Number of children: 3    Years of education: 15    Highest education level: None   Occupational History    Occupation: retired   Social Needs    Financial resource strain: None    Food insecurity:     Worry: None     Inability: None    Transportation needs:     Medical: None     Non-medical: None   Tobacco Use    Smoking status: Never Smoker    Smokeless tobacco: Never Used   Substance and Sexual Activity    Alcohol use: No    Drug use: No    Sexual activity: Not Currently     Partners: Male   Lifestyle    Physical activity:     Days per week: None     Minutes per session: None    Stress: None   Relationships    Social connections:     Talks on phone: None     Gets together: None     Attends Oriental orthodox service: None     Active member of club or organization: None     Attends meetings of clubs or organizations: None     Relationship status: None    Intimate partner violence:     Fear of current or ex partner: None     Emotionally abused: None     Physically abused: None     Forced sexual activity: None   Other Topics Concern    None   Social History Narrative    None       SCREENINGS               Review of Systems  Constitutional:  Denies fever, chills  Eyes: denies eye problems  HEENT: note:    CT Head WO Contrast   Final Result   No acute intracranial abnormality. CT ABDOMEN PELVIS W IV CONTRAST Additional Contrast? None   Final Result   Acute traumatic displaced right proximal femur fracture that involves the   lesser trochanter. No acute intra-abdominal or intrapelvic abnormality. Biliary ductal dilatation status post cholecystectomy, slightly increased   compared to prior from 2017. Moderate to large stool burden consistent with constipation. XR FEMUR RIGHT (MIN 2 VIEWS)   Final Result   Acute traumatic mildly displaced right subtrochanteric femur fracture. XR HIP RIGHT (2-3 VIEWS)   Final Result   Acute traumatic mildly displaced right subtrochanteric femur fracture.                LABS:  Labs Reviewed   CBC WITH AUTO DIFFERENTIAL - Abnormal; Notable for the following components:       Result Value    RBC 3.60 (*)     Hemoglobin 11.6 (*)     Hematocrit 34.3 (*)     All other components within normal limits    Narrative:     Performed at:  [place] [address],  [place], [facility]   Phone [phone]   BASIC METABOLIC PANEL W/ REFLEX TO MG FOR LOW K - Abnormal; Notable for the following components:    Glucose 119 (*)     BUN 31 (*)     All other components within normal limits    Narrative:     Performed at:  [facility],  [place], [facility]   Phone [phone]   APTT    Narrative:     Performed at:  [place] [address],  [place], [facility]   Phone [phone]   PROTIME-INR    Narrative:     Performed at:  [place] [address],  [place], [facility]   Phone [phone]   HEPATIC FUNCTION PANEL    Narrative:     Performed at:  [place] [address],  [place], [facility]   Phone [phone]   LIPASE    Narrative:     Performed at:  [facility]  [address],  [place], [place]   Phone [phone]       All other labs were within normal range or not returned as of this dictation. EMERGENCY DEPARTMENT COURSE and DIFFERENTIAL DIAGNOSIS/MDM:   Vitals:    Vitals:    06/18/19 1222   BP: (!) 146/97   Pulse: 66   Resp: 18   Temp: 98.1 °F (36.7 °C)   TempSrc: Oral   SpO2: 97%   Weight: 150 lb (68 kg)   Height: 5' 2\" (1.575 m)         MDM  71-year-old female presents after mechanical fall with slipping on some water in the kitchen. Reports right hip pain. Denies head trauma or loss of consciousness. Vital signs stable. Physical exam as above. Her right leg is shortened and externally rotated. She is neurovascularly intact in both lower extremities. She does have some tenderness to palpation of the abdomen. Unsure how reliable history is with son and patient giving conflicting stories. We will get baseline labs, screening EKG, CT head, CT abdomen and pelvis, right femur x-ray and right hip x-ray. Will give morphine for pain. Work-up shows CBC with no leukocytosis. Hemoglobin stable at 11.6 without signs of active bleeding. BMP shows a BUN elevated at 31. Will place on gentle IV hydration. PTT and INR within normalized. Hepatic function normal.  CT head without acute abnormality. CT abdomen shows constipation. Right femur x-ray shows an acute traumatic mildly displaced right subtrochanteric femur fracture. Will discuss with Ortho. Again patient is neurovascular intact. Patient will be admitted to the hospitalist.    CONSULTS:  IP CONSULT TO HOSPITALIST  IP CONSULT TO ORTHOPEDIC SURGERY      FINAL IMPRESSION      1. Closed displaced subtrochanteric fracture of right femur, [facility]          DISPOSITION/PLAN   DISPOSITION        PATIENT REFERRED TO:  No follow-up provider specified.     DISCHARGE MEDICATIONS:  New Prescriptions    No medications on file          (Please note that portions of this note were completed with a voice recognition program.  Efforts were made to edit the dictations but occasionally words are mis-transcribed.)    Scott Solano DO (electronically signed)  Attending Emergency Physician        Scott Solano DO  06/18/19 [address] [facility], DO  06/18/19 1407

## 2019-07-15 NOTE — TELEPHONE ENCOUNTER
----- Message from Shannon Toney sent at 7/15/2019 10:11 AM CDT -----  Contact: Self/ [phone]  RR: Patient needs a letter faxed to Rosey Mares with her reservation dates. Letter needs to be attention [place].    Rosey Mares    Check in July 21    Check out July 23

## 2019-07-22 ENCOUNTER — HOSPITAL ENCOUNTER (OUTPATIENT)
Dept: RADIOLOGY | Facility: HOSPITAL | Age: 59
Discharge: HOME | End: 2019-07-22
Attending: INTERNAL MEDICINE
Payer: COMMERCIAL

## 2019-07-22 DIAGNOSIS — C7A.021 MALIGNANT CARCINOID TUMOR OF THE CECUM: ICD-10-CM

## 2019-07-22 PROCEDURE — 78815 PET IMAGE W/CT SKULL-THIGH: CPT | Mod: 26,PI,, | Performed by: RADIOLOGY

## 2019-07-22 PROCEDURE — 74183 MRI ABD W/O CNTR FLWD CNTR: CPT | Mod: TC

## 2019-07-22 PROCEDURE — 78815 NM PET 68GA DOTATATE WHOLE BODY: ICD-10-PCS | Mod: 26,PI,, | Performed by: RADIOLOGY

## 2019-07-22 PROCEDURE — A9585 GADOBUTROL INJECTION: HCPCS | Performed by: INTERNAL MEDICINE

## 2019-07-22 PROCEDURE — 74183 MRI ABD W/O CNTR FLWD CNTR: CPT | Mod: 26,,, | Performed by: RADIOLOGY

## 2019-07-22 PROCEDURE — A9587 GALLIUM GA-68: HCPCS | Mod: TB

## 2019-07-22 PROCEDURE — 78815 PET IMAGE W/CT SKULL-THIGH: CPT | Mod: TC,PI

## 2019-07-22 PROCEDURE — 74183 MRI ABDOMEN W WO CONTRAST: ICD-10-PCS | Mod: 26,,, | Performed by: RADIOLOGY

## 2019-07-22 PROCEDURE — 25500020 PHARM REV CODE 255: Performed by: INTERNAL MEDICINE

## 2019-07-22 RX ORDER — GADOBUTROL 604.72 MG/ML
10 INJECTION INTRAVENOUS
Status: COMPLETED | OUTPATIENT
Start: 2019-07-22 | End: 2019-07-22

## 2019-07-22 RX ADMIN — GADOBUTROL 10 ML: 604.72 INJECTION INTRAVENOUS at 07:07

## 2019-07-23 ENCOUNTER — OFFICE VISIT (OUTPATIENT)
Dept: NEUROLOGY | Facility: HOSPITAL | Age: 59
End: 2019-07-23
Attending: INTERNAL MEDICINE
Payer: COMMERCIAL

## 2019-07-23 VITALS
HEART RATE: 58 BPM | SYSTOLIC BLOOD PRESSURE: 94 MMHG | WEIGHT: 129.75 LBS | HEIGHT: 64 IN | OXYGEN SATURATION: 98 % | BODY MASS INDEX: 22.15 KG/M2 | TEMPERATURE: 98 F | DIASTOLIC BLOOD PRESSURE: 66 MMHG

## 2019-07-23 DIAGNOSIS — C7B.8 SECONDARY NEUROENDOCRINE TUMOR OF LIVER: ICD-10-CM

## 2019-07-23 DIAGNOSIS — C7A.8 PRIMARY MALIGNANT NEUROENDOCRINE NEOPLASM OF CECUM: ICD-10-CM

## 2019-07-23 PROCEDURE — 3008F BODY MASS INDEX DOCD: CPT | Mod: CPTII,,, | Performed by: INTERNAL MEDICINE

## 2019-07-23 PROCEDURE — 99214 OFFICE O/P EST MOD 30 MIN: CPT | Performed by: INTERNAL MEDICINE

## 2019-07-23 PROCEDURE — 3008F PR BODY MASS INDEX (BMI) DOCUMENTED: ICD-10-PCS | Mod: CPTII,,, | Performed by: INTERNAL MEDICINE

## 2019-07-23 PROCEDURE — 99205 PR OFFICE/OUTPT VISIT, NEW, LEVL V, 60-74 MIN: ICD-10-PCS | Mod: ,,, | Performed by: INTERNAL MEDICINE

## 2019-07-23 PROCEDURE — 99205 OFFICE O/P NEW HI 60 MIN: CPT | Mod: ,,, | Performed by: INTERNAL MEDICINE

## 2019-07-23 RX ORDER — ERGOCALCIFEROL 1.25 MG/1
50000 CAPSULE ORAL
COMMUNITY

## 2019-07-23 NOTE — LETTER
July 23, 2019        Hussein Guerrier  [address]  [facility] [address]             [facility]  [address]  [place] [address]  Phone: [phone]  Fax: [phone]   Patient: Christina Muro   MR Number: [id number]   YOB: 1960   Date of Visit: 7/23/2019       Dear Dr. Guerrier:    Thank you for referring Christina Muro to me for evaluation. Attached you will find relevant portions of my assessment and plan of care.    If you have questions, please do not hesitate to call me. I look forward to following Christina Muro along with you.    Sincerely,      Lawrence Gant, , FACP            CC  No Recipients    Enclosure

## 2019-07-23 NOTE — PROGRESS NOTES
NOLAN[facility]:  [facility]  A collaboration between [facility] and [facility]    PATIENT: Christina Muro  MRN: [id number]  DATE: 7/23/2019      Diagnosis:   1. Primary malignant neuroendocrine neoplasm of cecum    2. Secondary neuroendocrine tumor of liver        Chief Complaint: Consult (NET of cecum)      Oncologic History:      Oncologic History Neuroendocrine tumor of the cecum diagnosed 01/2001  Metastatic disease to liver 2002    Oncologic Treatment Right hemicolectomy and resection terminal ileum 01/2001  Liver resection 2002, 1/2008  Octreotide LAR 2009-03/2016(discontinued due to progression)  Lanreotide 3/2016    Pathology           Subjective:    Interval History: Ms. Muro is a 59 y.o. female who is seen as an initial visit for a neuroendocrine tumor of the cecum.  Her history dates to 2001 when she developed acute onset abdominal pain and sought medical attention.  She was hospitalized and a colonoscopy was performed showing a neuroendocrine tumor of the cecum.  She underwent a right hemicolectomy and resection of the terminal ileum in 01/2001.  Within a year she was told she had metastasis to the liver and underwent a liver resection.  She did well for several years but ended up recurring within the liver and having an additional liver surgery in 01/2008.  In 2009 she was started on octreotide LAR in did well on this until 03/2016 when she was noted to have progressive disease and was switched to Lanreotide.  Gallium 68 PET-CT was performed in 07/2019 showing uptake within the liver and also extra hepatic lymph nodes.    She states that she generally feels well. She has developed some right upper quadrant abdominal pain over the last month.  This is sometimes related to her diet.  She also complains of some intermittent diarrhea but does have a rare formed stool.  Her diarrhea can be related to her  diet as well. She has lost 15-20 lb over the last 5-6 months.  She also complains of some intermittent shortness of breath, primarily on hot and humid days.  She states that she would be unable to climb a flight of steps without getting out of breath.  She has no other new complaints.    Past Medical History:   Past Medical History:   Diagnosis Date    Anxiety     Primary malignant neuroendocrine neoplasm of cecum 7/23/2019    Secondary neuroendocrine tumor of liver 7/23/2019       Past Surgical HIstory:   Past Surgical History:   Procedure Laterality Date    COLECTOMY      HYSTERECTOMY      lever cuate      TONSILLECTOMY         Family History:   Family History   Problem Relation Age of Onset    Cancer Mother         skin    Cancer Father         skin       Social History:  reports that she has been smoking cigarettes.  She has smoked for the past 47.00 years. She has never used smokeless tobacco. She reports that she has current or past drug history. Drug: Marijuana. She reports that she does not drink alcohol.    Allergies:  Review of patient's allergies indicates:   Allergen Reactions    Acetamide rosie        Medications:  Current Outpatient Medications   Medication Sig Dispense Refill    biotin 10,000 mcg Cap Take 7 capsules by mouth.      ergocalciferol (VITAMIN D2) 50,000 unit Cap Take 50,000 Units by mouth every 7 days.      escitalopram oxalate (LEXAPRO) 10 MG tablet escitalopram 10 mg tablet   Take 1 tablet every day by oral route.       No current facility-administered medications for this visit.        Review of Systems   Constitutional: Positive for unexpected weight change. Negative for chills and fever.        15-20 lb weight loss in 5-6 months; Normal weight 160   HENT: Negative for congestion, hearing loss and nosebleeds.    Eyes: Negative for visual disturbance.   Respiratory: Positive for shortness of breath. Negative for cough.    Cardiovascular: Negative for chest pain and  "palpitations.   Gastrointestinal: Positive for abdominal pain, diarrhea and nausea. Negative for blood in stool, constipation and vomiting.   Genitourinary: Negative for dysuria.   Musculoskeletal: Negative for back pain and gait problem.   Skin: Negative for color change and rash.   Neurological: Negative for dizziness, weakness and headaches.   Hematological: Negative for adenopathy. Does not bruise/bleed easily.   Psychiatric/Behavioral: Negative for confusion.       ECOG Performance Status: 1   Objective:      Vitals:   Vitals:    07/23/19 1316   BP: 94/66   BP Location: Right arm   Patient Position: Sitting   BP Method: Medium (Automatic)   Pulse: (!) 58   Temp: 97.6 °F (36.4 °C)   TempSrc: Oral   SpO2: 98%   Weight: 58.8 kg (129 lb 11.9 oz)   Height: 5' 4" (1.626 m)     BMI: Body mass index is 22.27 kg/m².    Physical Exam   Constitutional: She is oriented to person, place, and time. She appears well-developed and well-nourished. No distress.   HENT:   Head: Normocephalic.   Mouth/Throat: No oropharyngeal exudate.   Eyes: EOM are normal. No scleral icterus.   Neck: Neck supple. No tracheal deviation present. No thyromegaly present.   Cardiovascular: Normal rate and regular rhythm.   Pulmonary/Chest: Effort normal and breath sounds normal. No respiratory distress. She has no wheezes. She has no rales.   Abdominal: Soft. She exhibits no distension and no mass. There is tenderness. There is no rebound and no guarding.   Musculoskeletal: Normal range of motion. She exhibits no edema.   Lymphadenopathy:     She has no cervical adenopathy.   Neurological: She is alert and oriented to person, place, and time. No cranial nerve deficit.   Skin: Skin is warm and dry.   Psychiatric: She has a normal mood and affect.       Laboratory Data:  No visits with results within 1 Month(s) from this visit.   Latest known visit with results is:   Abstract on 06/14/2019   Component Date Value Ref Range Status    EXT CHROMOGRANIN A " 03/25/2019 2,596* 0 - 95 Final       Imaging:   [id number] 7/22/19      COMPARISON:  None    FINDINGS:  Quality of the study: Adequate.    There are numerous regions of abnormal tracer with index lesions as follows:    Lesion 1: Segment 7 near IVC, this shows max SUV of 32.9.    Lesion 2: Segment 5 near gallbladder fossa, this shows max SUV of 48.4.    Lesion 3: Retroperitoneal, periaortic lymph node between the aorta and IVC at the level of L2, this shows max SUV of 12.4.    Physiologic uptake of the tracer is seen within the pituitary, liver, spleen, kidneys, adrenal glands and bowel.    Incidental CT findings: None.      Impression       There is evidence of somatostatin receptor positive disease involving multiple hepatic lesions and retroperitoneal, periaortic lymph nodes.     MRI 07/22/2019    COMPARISON:  PET Dotate Scan dated 7/22/2019    FINDINGS:  Pulmonary Bases: No pleural effusion.    Liver: Multiple intrahepatic lesions with associated diffusion restriction and variable degree of hypoenhancement.  For reference, near the junction of the right anterior segment and left hepatic segment IV measuring 4.9 cm (series 1500, image 53) and right posterior segment VII measuring 2.9 cm (series 1500, image 40).  Hepatic vasculature is patent noting narrowing at the level of the main portal vein related to enlarged 1.9 cm miky hepatic node.    Bile Ducts: No biliary dilatation.    Gallbladder: Surgically absent.    Pancreas: Normal    Spleen: normal.    Proximal Gastrointestinal tract: Normal caliber    Mesentery: No discrete mesenteric mass.    Adrenal Glands: normal.    Kidneys: No hydronephrosis.    Aorta and Abdominal Vasculature: normal.    Lymph nodes: Increased number of prominent to enlarged para-aortic nodes.    Body wall: Gluteal injection granulomas.    Other: No free fluid.  No suspicious marrow enhancing lesion.      Impression       Intrahepatic lesions with periportal and retroperitoneal adenopathy  concerning for metastasis.  Associated mass effect with narrowing of the main portal vein without definite invasion.    RECIST SUMMARY:    Date of prior examination for comparison: No prior similar MR imaging performed at this institution.    Lesion 1: Junction of the right anterior segment and left segment IV.  4.9 cm.  Series 1500, image 53.    Lesion 2: Right posterior segment VII.  2.9 cm.  Series 1500, image 40.              Assessment:       1. Primary malignant neuroendocrine neoplasm of cecum    2. Secondary neuroendocrine tumor of liver           Plan:     I have had a long discussion with Mrs. Muro and multiple family members today concerning her disease.  She has a neuroendocrine tumor of the cecum with metastatic disease to the liver.  She has had multiple treatments including debulking, Sandostatin and also Lanreotide.  Review of her scans does show multiple lesions within the liver and also I few extra hepatic lymph nodes which are PET avid.  I have discussed multiple different options for treatment with her.  First option for treatment that I have discussed would be PRRT using [id number].  I have discussed the rationale, alternatives and potential side effects of this treatment with her.  I have given her written information on this medication.  Another option could potentially be surgical resection of her liver tumors.  These do appear to be peripheral in nature and could possibly be resected.  I will review her scans at our neuroendocrine tumor board next week and pose this question.  Additionally, liver directed therapy could also be an option.  Given the fact that we are considering PRRT I would avoid radioembolization but would consider either bland or chemo embolization.  Other alternative such is Afinitor or Sutent I would favor holding off on for right now and if we do consider systemic therapy then PRRT would be my 1st choice.  She did have multiple questions about the safety of PRRT  specifically the radiation precautions which we have discussed.  Given her shortness of breath I will send her for a 2D echocardiogram at this time to evaluate for carcinoid heart disease.  I will also send her for neuroendocrine specific tumor markers today.  I told her we would contact her following tumor board with recommendations.  Multiple questions were answered and she is agreeable with this plan.      Lawrence Gant DO, FACP  Hematology & Oncology, [facility]\"" [facility]  [address], [address]  [place]  [address]  ph. [phone]; [phone]  fax. [phone]      60 minutes were spent in coordination of patient's care, record review and counseling.  More than 50% of the time was face-to-face.

## 2019-07-23 NOTE — PATIENT INSTRUCTIONS
Labs today, [facility], [address]    Echo order, given to pt.    You will be discussed in Tumor Board on 8/6/19, and will be contacted with recommendations.

## 2019-08-12 ENCOUNTER — TELEPHONE (OUTPATIENT)
Dept: NEUROLOGY | Facility: HOSPITAL | Age: 59
End: 2019-08-12

## 2019-08-12 NOTE — TELEPHONE ENCOUNTER
----- Message from Carlton Philip sent at 8/12/2019 11:41 AM CDT -----  Contact: Patient  Needs Advice    Reason for call: Wants to know if the tumor board has met to discuss her options moving fwd.        Communication Preference: [phone]    Additional Information:

## 2019-08-13 LAB
5-HIAA, PLASMA (NEUROEND): 559 NG/ML
ALBUMIN SERPL-MCNC: 4.5 G/DL (ref 3.6–5.1)
ALBUMIN/GLOB SERPL: 1.7 (CALC) (ref 1–2.5)
ALP SERPL-CCNC: 88 U/L (ref 33–130)
ALT SERPL-CCNC: 12 U/L (ref 6–29)
AST SERPL-CCNC: 15 U/L (ref 10–35)
BASOPHILS # BLD AUTO: 53 CELLS/UL (ref 0–200)
BASOPHILS NFR BLD AUTO: 0.7 %
BILIRUB SERPL-MCNC: 1.3 MG/DL (ref 0.2–1.2)
BUN SERPL-MCNC: 12 MG/DL (ref 7–25)
BUN/CREAT SERPL: ABNORMAL (CALC) (ref 6–22)
CALCIUM SERPL-MCNC: 9.9 MG/DL (ref 8.6–10.4)
CHLORIDE SERPL-SCNC: 106 MMOL/L (ref 98–110)
CO2 SERPL-SCNC: 28 MMOL/L (ref 20–32)
CREAT SERPL-MCNC: 0.79 MG/DL (ref 0.5–1.05)
EOSINOPHIL # BLD AUTO: 30 CELLS/UL (ref 15–500)
EOSINOPHIL NFR BLD AUTO: 0.4 %
ERYTHROCYTE [DISTWIDTH] IN BLOOD BY AUTOMATED COUNT: 12.4 % (ref 11–15)
GFRSERPLBLD MDRD-ARVRAT: 83 ML/MIN/1.73M2
GLOBULIN SER CALC-MCNC: 2.7 G/DL (CALC) (ref 1.9–3.7)
GLUCOSE SERPL-MCNC: 103 MG/DL (ref 65–99)
HCT VFR BLD AUTO: 40.1 % (ref 35–45)
HGB BLD-MCNC: 13.4 G/DL (ref 11.7–15.5)
LYMPHOCYTES # BLD AUTO: 2595 CELLS/UL (ref 850–3900)
LYMPHOCYTES NFR BLD AUTO: 34.6 %
MCH RBC QN AUTO: 32.1 PG (ref 27–33)
MCHC RBC AUTO-ENTMCNC: 33.4 G/DL (ref 32–36)
MCV RBC AUTO: 95.9 FL (ref 80–100)
MONOCYTES # BLD AUTO: 608 CELLS/UL (ref 200–950)
MONOCYTES NFR BLD AUTO: 8.1 %
NEUROKININ A: 19 PG/ML
NEUTROPHILS # BLD AUTO: 4215 CELLS/UL (ref 1500–7800)
NEUTROPHILS NFR BLD AUTO: 56.2 %
PANCREASTATIN: NORMAL PG/ML (ref 10–135)
PLATELET # BLD AUTO: 244 THOUSAND/UL (ref 140–400)
PMV BLD REES-ECKER: 10.1 FL (ref 7.5–12.5)
POTASSIUM SERPL-SCNC: 4.2 MMOL/L (ref 3.5–5.3)
PROT SERPL-MCNC: 7.2 G/DL (ref 6.1–8.1)
RBC # BLD AUTO: 4.18 MILLION/UL (ref 3.8–5.1)
SEROTONIN SER-MCNC: 1013 NG/ML (ref 56–244)
SODIUM SERPL-SCNC: 142 MMOL/L (ref 135–146)
WBC # BLD AUTO: 7.5 THOUSAND/UL (ref 3.8–10.8)

## 2019-10-04 ENCOUNTER — TELEPHONE (OUTPATIENT)
Dept: NEUROLOGY | Facility: HOSPITAL | Age: 59
End: 2019-10-04

## 2019-10-04 NOTE — TELEPHONE ENCOUNTER
"----- Message from Raeann Christine sent at 10/4/2019 12:41 PM CDT -----  Contact: Christina  Consult/Advisory:    Name Of Caller: Christina   Provider Name: Lawrence Gant MD  What is the nature of the call?:     - pt called because she has been waiting for test results and to discuss them. Please contact her and advise if they are available and uploaded.   Does patient feel the need to be seen today? No   Relationship to the Pt?: self   Contact Preference?: [phone]     Additional Notes:  "Thank you for all that you do for our patients'"      "

## 2019-10-21 ENCOUNTER — TELEPHONE (OUTPATIENT)
Dept: NEUROLOGY | Facility: HOSPITAL | Age: 59
End: 2019-10-21

## 2019-10-21 NOTE — TELEPHONE ENCOUNTER
LVM that number called repeatedly with message can not be completed as dialed.  Called cell, let patient know message received, nothing recently faxed, last was on 10/4 from cardiology.  Fax number given, asked her to call back, number given.

## 2019-10-21 NOTE — TELEPHONE ENCOUNTER
----- Message from Ata Argueta sent at 10/21/2019 11:16 AM CDT -----  Contact: Christina Muro would like to know if the faxes that she sent. She can be reached at [phone]

## 2019-11-06 ENCOUNTER — TELEPHONE (OUTPATIENT)
Dept: NEUROLOGY | Facility: HOSPITAL | Age: 59
End: 2019-11-06

## 2019-11-06 NOTE — TELEPHONE ENCOUNTER
----- Message from Nathan Maya sent at 11/6/2019 10:55 AM CST -----  Type:  Needs Medical Advice    Who Called: Pt    Would the patient rather a call back or a response via [facility]? Callback    Best Call Back Number: [phone]    Additional Information: Speak w/ Catherine regarding a discussion between the doctor    and the surgical team

## 2019-12-27 ENCOUNTER — TELEPHONE (OUTPATIENT)
Dept: NEUROLOGY | Facility: HOSPITAL | Age: 59
End: 2019-12-27

## 2019-12-27 NOTE — TELEPHONE ENCOUNTER
----- Message from Tarah Rodríguez sent at 12/26/2019  1:43 PM CST -----  Contact: SELF [phone]  RR -  [facility] called and asked that patient be scheduled for  a follow up appointment. Please call

## 2020-01-27 ENCOUNTER — TELEPHONE (OUTPATIENT)
Dept: NEUROLOGY | Facility: HOSPITAL | Age: 60
End: 2020-01-27

## 2020-01-27 NOTE — TELEPHONE ENCOUNTER
----- Message from Jennifer Che sent at 1/27/2020  3:09 PM CST -----  Contact: self  Patient is requesting a call back concerning a surgery that the Dr was going to do on her. Please call

## 2020-04-03 ENCOUNTER — PATIENT MESSAGE (OUTPATIENT)
Dept: NEUROLOGY | Facility: HOSPITAL | Age: 60
End: 2020-04-03